# Patient Record
Sex: MALE | URBAN - METROPOLITAN AREA
[De-identification: names, ages, dates, MRNs, and addresses within clinical notes are randomized per-mention and may not be internally consistent; named-entity substitution may affect disease eponyms.]

---

## 2024-08-12 ENCOUNTER — TELEPHONE (OUTPATIENT)
Dept: INTERNAL MEDICINE CLINIC | Age: 74
End: 2024-08-12

## 2024-08-12 NOTE — TELEPHONE ENCOUNTER
----- Message from Tru IVAN sent at 8/12/2024 10:53 AM EDT -----  Regarding: [facility] Appointment Request  [facility] Appointment Request    Patient needs appointment for [facility] Appointment Type: New Patient.    Patient Requested Dates(s): any day of the week (after September 1st)  Patient Requested Time: Morning   Provider Name: Lazaro Ovalle,     Reason for Appointment Request: New Patient - Requested Provider unavailable / establish care/ new patient appt  --------------------------------------------------------------------------------------------------------------------------    Relationship to Patient: [facility]       Call Back Information: OK to leave message on voicemail  Preferred Call Back Number: Phone Telephone Information: Mobile          [phone]